# Patient Record
Sex: FEMALE | Employment: OTHER | URBAN - METROPOLITAN AREA
[De-identification: names, ages, dates, MRNs, and addresses within clinical notes are randomized per-mention and may not be internally consistent; named-entity substitution may affect disease eponyms.]

---

## 2020-05-20 ENCOUNTER — PRE VISIT (OUTPATIENT)
Dept: NEUROLOGY | Facility: CLINIC | Age: 45
End: 2020-05-20

## 2020-05-20 NOTE — TELEPHONE ENCOUNTER
FUTURE VISIT INFORMATION      FUTURE VISIT INFORMATION:    Date: 8/3/2020    Time: 3pm    Location: [facility]  REFERRAL INFORMATION:    Referring provider:  Self     Referring providers clinic:      Reason for visit/diagnosis  Myasthenia Gravis     RECORDS REQUESTED FROM:       Clinic name Comments Records Status Imaging Status   [facility]  Requested  Requested

## 2020-08-24 ENCOUNTER — OFFICE VISIT (OUTPATIENT)
Dept: NEUROLOGY | Facility: CLINIC | Age: 45
End: 2020-08-24
Payer: COMMERCIAL

## 2020-08-24 VITALS
DIASTOLIC BLOOD PRESSURE: 76 MMHG | RESPIRATION RATE: 16 BRPM | HEART RATE: 65 BPM | SYSTOLIC BLOOD PRESSURE: 126 MMHG | OXYGEN SATURATION: 99 %

## 2020-08-24 DIAGNOSIS — G70.00 MYASTHENIA GRAVIS WITHOUT EXACERBATION (H): Primary | ICD-10-CM

## 2020-08-24 ASSESSMENT — PAIN SCALES - GENERAL: PAINLEVEL: NO PAIN (0)

## 2020-08-24 NOTE — LETTER
8/24/2020     RE: Radha Nails  [address]  [facility] [address]     Dear Colleague,    Thank you for referring your patient, Radha Nails, to the [facility] NEUROLOGY at [facility]. Please see a copy of my visit note below.    Neuromuscular follow-up clinic note    History of Present Illness:    Ms. Nails is a 45 year old woman with a past medical history of seropositive generalized myasthenia gravis status post thymectomy in February 2006 now in remission who presents for follow-up.    Interval history: She was last seen by Dr. Dean in 2011.  She was previously on CellCept, prednisone and IVIG prior to 2011.  She has not received any treatment since that time.  No exacerbations since then.  She overall feels great with no symptoms of diplopia, ptosis, dysarthria, dysphasia, or weakness.  She has been running half marathons which is going well.  She is training to run a full marathon and has questions concerning limitations to running as it pertains to myasthenia gravis.     Prior pertinent laboratory work-up:    August 2010: Ach R ab: 45.0    Prior electrophysiologic work-up:  Nerve conduction studies/needle EMG by Dr. Bean which showed 1) Myopathy  2) Abnormal single fiber EMG that supports the diagnosis of ongoing neuromuscular transmission disorder.     Medical Allergies:    No Known Allergies     Current Medications:   No current outpatient medications    Review of Systems: A complete review of systems was obtained and was negative except for what was noted above.      Physical examination:    /76   Pulse 65   Resp 16   SpO2 99%   General Appearance: NAD    Skin: There are no rashes or other skin lesions.    Neurologic examination:    Mental status:  Patient is alert, attentive, and oriented x 3.  Language is coherent and fluent without dysarthria or aphasia.  Memory, comprehension and ability to follow commands were intact.       Cranial nerves:  Pupils were round and reacted to light.  Extraocular movements were full.  No ptosis.  No diplopia with sustained lateral gaze and upgaze.  No ptosis with sustained upgaze.  There was no face, jaw, palate or tongue weakness or atrophy. Hearing was grossly intact.  Shoulder shrug was normal.      Motor exam: No atrophy or fasciculations.    Manual muscle testing revealed the following MRC grade muscle power:   Right Left   Neck flexion 5    Neck extension 5    Shoulder ext rotation 5 5   Elbow extension 5 5   Elbow flexion  5 5   Wrist extension 5 5   Finger extension 5 5   Finger flexion 5 5   FDI 5 5   APB 5 5   Hip flexion 5 5   Knee extension 5 5   Knee flexion 5 5   Dorsiflexion 5 5   Plantarflexion 5 5     Complex motor skills revealed normal coordination.  Finger-nose- finger and heel to shin were intact.       Sensory exam intact to light touch    Gait was normal.        Deep tendon reflexes:   Right Left   Triceps 2 2   Biceps 2 2   Brachioradialis 2 2   Knee jerk 2 2   Ankle jerk 2 2       MG ADL score: 0    MG composite score: 0     Assessment:    Ms. Nails is a 45 year old woman with a past medical history of seropositive generalized myasthenia gravis status post thymectomy in February 2006 now in remission who presents for follow-up.    1) Seropositive generalized myasthenia gravis.  She has done remarkably well and has no longer required treatment since 2011.  She is remained stable since that time without any exacerbations.  She remains very active and is currently running half marathons.  Her main concern today was in regards to her limitations with exercise in the setting of myasthenia gravis.  We reviewed with her she currently is in remission and has a low likelihood of having a future exacerbation but is possible.  We also reviewed with her triggers for myasthenia gravis exacerbations.  To our knowledge, exercise itself is not necessarily something that will trigger an exacerbation.  We advised  her to continue with her exercise regimen as she does not appreciate any weakness or excessive fatigue after running a half marathon.  We discussed with her that if she does start to notice changes with her recovery from exercise and that it becomes more challenging or she has more fatigue than usual that this would be a sign she is overdoing it.  At this time we had no additional recommendations.  Again she is doing great from myasthenia gravis standpoint and can follow-up on an as-needed basis.    Plan:      1.  Follow-up as needed    Rishi Gibbs MD  Neuromuscular Fellow    I personally examined this patient, reviewed vital signs and pertinent auxiliary test results.  This note details our findings, impression and plan that we formulated together.    I spent total of 30 minutes face-to-face with Radha Nails during today's visit. Over 50% of this time was spent counseling the patient and coordinating care. See note for details.    Sincerely yours,      Hoang Dean MD  Pediatric Neurology  [phone]

## 2020-08-24 NOTE — PROGRESS NOTES
Neuromuscular follow-up clinic note    History of Present Illness:    Ms. Nails is a 45 year old woman with a past medical history of seropositive generalized myasthenia gravis status post thymectomy in February 2006 now in remission who presents for follow-up.    Interval history: She was last seen by Dr. Dean in 2011.  She was previously on CellCept, prednisone and IVIG prior to 2011.  She has not received any treatment since that time.  No exacerbations since then.  She overall feels great with no symptoms of diplopia, ptosis, dysarthria, dysphasia, or weakness.  She has been running half marathons which is going well.  She is training to run a full marathon and has questions concerning limitations to running as it pertains to myasthenia gravis.     Prior pertinent laboratory work-up:    August 2010: Ach R ab: 45.0    Prior electrophysiologic work-up:  Nerve conduction studies/needle EMG by Dr. Bean which showed 1) Myopathy  2) Abnormal single fiber EMG that supports the diagnosis of ongoing neuromuscular transmission disorder.     Medical Allergies:    No Known Allergies     Current Medications:   No current outpatient medications    Review of Systems: A complete review of systems was obtained and was negative except for what was noted above.    Physical examination:    /76   Pulse 65   Resp 16   SpO2 99%   General Appearance: NAD    Skin: There are no rashes or other skin lesions.    Neurologic examination:    Mental status:  Patient is alert, attentive, and oriented x 3.  Language is coherent and fluent without dysarthria or aphasia.  Memory, comprehension and ability to follow commands were intact.       Cranial nerves: Pupils were round and reacted to light.  Extraocular movements were full.  No ptosis.  No diplopia with sustained lateral gaze and upgaze.  No ptosis with sustained upgaze.  There was no face, jaw, palate or tongue weakness or atrophy. Hearing was grossly intact.  Shoulder  shrug was normal.      Motor exam: No atrophy or fasciculations.    Manual muscle testing revealed the following MRC grade muscle power:   Right Left   Neck flexion 5    Neck extension 5    Shoulder ext rotation 5 5   Elbow extension 5 5   Elbow flexion  5 5   Wrist extension 5 5   Finger extension 5 5   Finger flexion 5 5   FDI 5 5   APB 5 5   Hip flexion 5 5   Knee extension 5 5   Knee flexion 5 5   Dorsiflexion 5 5   Plantarflexion 5 5     Complex motor skills revealed normal coordination.  Finger-nose- finger and heel to shin were intact.       Sensory exam intact to light touch    Gait was normal.        Deep tendon reflexes:   Right Left   Triceps 2 2   Biceps 2 2   Brachioradialis 2 2   Knee jerk 2 2   Ankle jerk 2 2       MG ADL score: 0    MG composite score: 0     Assessment:    Ms. Nails is a 45 year old woman with a past medical history of seropositive generalized myasthenia gravis status post thymectomy in February 2006 now in remission who presents for follow-up.    1) Seropositive generalized myasthenia gravis.  She has done remarkably well and has no longer required treatment since 2011.  She is remained stable since that time without any exacerbations.  She remains very active and is currently running half marathons.  Her main concern today was in regards to her limitations with exercise in the setting of myasthenia gravis.  We reviewed with her she currently is in remission and has a low likelihood of having a future exacerbation but is possible.  We also reviewed with her triggers for myasthenia gravis exacerbations.  To our knowledge, exercise itself is not necessarily something that will trigger an exacerbation.  We advised her to continue with her exercise regimen as she does not appreciate any weakness or excessive fatigue after running a half marathon.  We discussed with her that if she does start to notice changes with her recovery from exercise and that it becomes more challenging or she  has more fatigue than usual that this would be a sign she is overdoing it.  At this time we had no additional recommendations.  Again she is doing great from myasthenia gravis standpoint and can follow-up on an as-needed basis.    Plan:      1.  Follow-up as needed    Rishi Gibbs MD  Neuromuscular Fellow    I personally examined this patient, reviewed vital signs and pertinent auxiliary test results.  This note details our findings, impression and plan that we formulated together.    I spent total of 30 minutes face-to-face with Radha Nails during today's visit. Over 50% of this time was spent counseling the patient and coordinating care. See note for details.    Sincerely yours,      Hoang Dean MD  Pediatric Neurology  [phone]

## 2020-08-24 NOTE — NURSING NOTE
Chief Complaint   Patient presents with     Consult     [facility] NEW MYASTHENIA GRAVIS       Justin Giles

## 2020-08-25 NOTE — PATIENT INSTRUCTIONS
Discussed exercise and how it relates to myasthenia gravis.  Follow up as needed  Please contact via my chart if any questions or concerns

## 2020-12-06 ENCOUNTER — HEALTH MAINTENANCE LETTER (OUTPATIENT)
Age: 45
End: 2020-12-06

## 2021-02-20 ENCOUNTER — HEALTH MAINTENANCE LETTER (OUTPATIENT)
Age: 46
End: 2021-02-20

## 2021-09-25 ENCOUNTER — HEALTH MAINTENANCE LETTER (OUTPATIENT)
Age: 46
End: 2021-09-25

## 2021-10-27 PROBLEM — G70.00 MYASTHENIA GRAVIS WITHOUT EXACERBATION (H): Status: ACTIVE | Noted: 2021-10-27

## 2022-01-15 ENCOUNTER — HEALTH MAINTENANCE LETTER (OUTPATIENT)
Age: 47
End: 2022-01-15

## 2022-03-12 ENCOUNTER — HEALTH MAINTENANCE LETTER (OUTPATIENT)
Age: 47
End: 2022-03-12

## 2023-04-22 ENCOUNTER — HEALTH MAINTENANCE LETTER (OUTPATIENT)
Age: 48
End: 2023-04-22

## 2023-08-30 ENCOUNTER — TRANSFERRED RECORDS (OUTPATIENT)
Dept: HEALTH INFORMATION MANAGEMENT | Facility: CLINIC | Age: 48
End: 2023-08-30

## 2023-11-21 ENCOUNTER — TRANSFERRED RECORDS (OUTPATIENT)
Dept: HEALTH INFORMATION MANAGEMENT | Facility: CLINIC | Age: 48
End: 2023-11-21